# Patient Record
Sex: FEMALE | Race: WHITE
[De-identification: names, ages, dates, MRNs, and addresses within clinical notes are randomized per-mention and may not be internally consistent; named-entity substitution may affect disease eponyms.]

---

## 2019-06-18 ENCOUNTER — HOSPITAL ENCOUNTER (OUTPATIENT)
Dept: HOSPITAL 96 - M.PUL | Age: 44
End: 2019-06-18
Attending: ORTHOPAEDIC SURGERY
Payer: COMMERCIAL

## 2019-06-18 DIAGNOSIS — J44.9: Primary | ICD-10-CM

## 2019-06-18 DIAGNOSIS — Z87.891: ICD-10-CM

## 2025-06-04 NOTE — PF
[address]  [address]                    PULMONARY FUNCTION REPORT     
_______________________________________________________________________________
 
Name:       ZULAY ARREDONDO              Room:                      REG CLI 
M.R.#:  [id number]      Account #:      [id number]  
Admission:  06/18/19     Attend Phys:    Rodney Lorenzana MD   
Discharge:               Date of Birth:  [birth year]  
         Report #: [id number]
                                                                     [id number]  
_______________________________________________________________________________
THIS REPORT FOR:  //name//                      
 
CC: Rodney Lorenzana
    [facility] unknown
 
DATE OF SERVICE:  06/19/2019
 
 
REFERRING PHYSICIAN:  Rodney Lorenzana MD
 
TYPE OF STUDY:  Pulmonary function tests.
 
SPIROMETRY:  The FEV1/FVC was 75% predicted.  The FEV1 was 4.34 liters at 136%
predicted.  Forced vital capacity was 5.65 liter at 143% predicted.  No
significant bronchodilator response postbronchodilator therapy.  The total lung
capacity was not performed.
 
DLCO was 99% of predicted.
 
IMPRESSION:  The spirometry did not show evidence of obstructive pulmonary
defect.  No suggestion of restrictive defect.  DLCO was normal.
 
 
 
 
 
 
 
 
 
 
 
 
 
 
 
 
 
 
 
 
 
 
 
<ELECTRONICALLY SIGNED>
                                        By:  Keisha Barrow MD              
06/20/19     [id number]
D: 06/19/19 0952_______________________________________
T: 06/19/19 1106Daaureliano Barrow MD                 /nt Patient has cut back to one cigarette per day she is encouraged to quit